# Patient Record
Sex: MALE
[De-identification: names, ages, dates, MRNs, and addresses within clinical notes are randomized per-mention and may not be internally consistent; named-entity substitution may affect disease eponyms.]

---

## 2017-03-23 ENCOUNTER — CHARTING TRANS (OUTPATIENT)
Dept: OTHER | Age: 22
End: 2017-03-23

## 2018-11-05 VITALS
HEART RATE: 84 BPM | TEMPERATURE: 98.2 F | HEIGHT: 77 IN | RESPIRATION RATE: 20 BRPM | WEIGHT: 160 LBS | BODY MASS INDEX: 18.89 KG/M2

## 2019-08-09 ENCOUNTER — APPOINTMENT (OUTPATIENT)
Dept: ULTRASOUND IMAGING | Age: 24
DRG: 270 | End: 2019-08-09
Attending: PHYSICIAN ASSISTANT
Payer: MEDICAID

## 2019-08-09 ENCOUNTER — APPOINTMENT (OUTPATIENT)
Dept: CT IMAGING | Facility: HOSPITAL | Age: 24
DRG: 270 | End: 2019-08-09
Attending: EMERGENCY MEDICINE
Payer: MEDICAID

## 2019-08-09 ENCOUNTER — HOSPITAL ENCOUNTER (INPATIENT)
Facility: HOSPITAL | Age: 24
LOS: 5 days | Discharge: HOME | DRG: 270 | End: 2019-08-14
Attending: EMERGENCY MEDICINE | Admitting: HOSPITALIST
Payer: MEDICAID

## 2019-08-09 ENCOUNTER — APPOINTMENT (OUTPATIENT)
Dept: INTERVENTIONAL RADIOLOGY/VASCULAR | Facility: HOSPITAL | Age: 24
DRG: 270 | End: 2019-08-09
Attending: SURGERY
Payer: MEDICAID

## 2019-08-09 ENCOUNTER — APPOINTMENT (OUTPATIENT)
Dept: CT IMAGING | Age: 24
DRG: 270 | End: 2019-08-09
Attending: EMERGENCY MEDICINE
Payer: MEDICAID

## 2019-08-09 DIAGNOSIS — I26.99 ACUTE PULMONARY EMBOLISM WITHOUT ACUTE COR PULMONALE, UNSPECIFIED PULMONARY EMBOLISM TYPE (HCC): ICD-10-CM

## 2019-08-09 DIAGNOSIS — G57.82 NEURITIS OF LEFT SURAL NERVE: Primary | ICD-10-CM

## 2019-08-09 DIAGNOSIS — I26.99 PULMONARY INFARCT (HCC): ICD-10-CM

## 2019-08-09 DIAGNOSIS — I82.492 ACUTE DEEP VEIN THROMBOSIS (DVT) OF OTHER SPECIFIED VEIN OF LEFT LOWER EXTREMITY (HCC): ICD-10-CM

## 2019-08-09 DIAGNOSIS — I70.90 ARTERIAL OCCLUSION: ICD-10-CM

## 2019-08-09 LAB
ALBUMIN SERPL-MCNC: 3.5 G/DL (ref 3.4–5)
ALP LIVER SERPL-CCNC: 88 U/L (ref 45–117)
ALT SERPL-CCNC: 34 U/L (ref 16–61)
ANION GAP SERPL CALC-SCNC: 8 MMOL/L (ref 0–18)
APTT PPP: 31.7 SECONDS (ref 25.4–36.1)
AST SERPL-CCNC: 30 U/L (ref 15–37)
BASOPHILS # BLD AUTO: 0.03 X10(3) UL (ref 0–0.2)
BASOPHILS NFR BLD AUTO: 0.4 %
BILIRUB DIRECT SERPL-MCNC: <0.1 MG/DL (ref 0–0.2)
BILIRUB SERPL-MCNC: 0.4 MG/DL (ref 0.1–2)
BUN BLD-MCNC: 6 MG/DL (ref 7–18)
BUN/CREAT SERPL: 7.3 (ref 10–20)
CALCIUM BLD-MCNC: 8.9 MG/DL (ref 8.5–10.1)
CHLORIDE SERPL-SCNC: 103 MMOL/L (ref 98–112)
CO2 SERPL-SCNC: 26 MMOL/L (ref 21–32)
CREAT BLD-MCNC: 0.82 MG/DL (ref 0.7–1.3)
DEPRECATED RDW RBC AUTO: 39.8 FL (ref 35.1–46.3)
EOSINOPHIL # BLD AUTO: 0.07 X10(3) UL (ref 0–0.7)
EOSINOPHIL NFR BLD AUTO: 1 %
ERYTHROCYTE [DISTWIDTH] IN BLOOD BY AUTOMATED COUNT: 11.8 % (ref 11–15)
GLUCOSE BLD-MCNC: 92 MG/DL (ref 70–99)
HCT VFR BLD AUTO: 42.9 % (ref 39–53)
HGB BLD-MCNC: 14.9 G/DL (ref 13–17.5)
IMM GRANULOCYTES # BLD AUTO: 0.02 X10(3) UL (ref 0–1)
IMM GRANULOCYTES NFR BLD: 0.3 %
INR BLD: 0.89 (ref 0.9–1.1)
LYMPHOCYTES # BLD AUTO: 2.05 X10(3) UL (ref 1–4)
LYMPHOCYTES NFR BLD AUTO: 29 %
M PROTEIN MFR SERPL ELPH: 7.4 G/DL (ref 6.4–8.2)
MCH RBC QN AUTO: 32.3 PG (ref 26–34)
MCHC RBC AUTO-ENTMCNC: 34.7 G/DL (ref 31–37)
MCV RBC AUTO: 93.1 FL (ref 80–100)
MONOCYTES # BLD AUTO: 0.68 X10(3) UL (ref 0.1–1)
MONOCYTES NFR BLD AUTO: 9.6 %
NEUTROPHILS # BLD AUTO: 4.21 X10 (3) UL (ref 1.5–7.7)
NEUTROPHILS # BLD AUTO: 4.21 X10(3) UL (ref 1.5–7.7)
NEUTROPHILS NFR BLD AUTO: 59.7 %
OSMOLALITY SERPL CALC.SUM OF ELEC: 281 MOSM/KG (ref 275–295)
PLATELET # BLD AUTO: 239 10(3)UL (ref 150–450)
POTASSIUM SERPL-SCNC: 4.6 MMOL/L (ref 3.5–5.1)
PSA SERPL DL<=0.01 NG/ML-MCNC: 12.1 SECONDS (ref 12.2–14.4)
RBC # BLD AUTO: 4.61 X10(6)UL (ref 4.3–5.7)
SODIUM SERPL-SCNC: 137 MMOL/L (ref 136–145)
WBC # BLD AUTO: 7.1 X10(3) UL (ref 4–11)

## 2019-08-09 PROCEDURE — 99223 1ST HOSP IP/OBS HIGH 75: CPT | Performed by: HOSPITALIST

## 2019-08-09 PROCEDURE — 04HN33Z INSERTION OF INFUSION DEVICE INTO LEFT POPLITEAL ARTERY, PERCUTANEOUS APPROACH: ICD-10-PCS | Performed by: SURGERY

## 2019-08-09 PROCEDURE — 3E05317 INTRODUCTION OF OTHER THROMBOLYTIC INTO PERIPHERAL ARTERY, PERCUTANEOUS APPROACH: ICD-10-PCS | Performed by: SURGERY

## 2019-08-09 PROCEDURE — 93971 EXTREMITY STUDY: CPT | Performed by: PHYSICIAN ASSISTANT

## 2019-08-09 PROCEDURE — 71275 CT ANGIOGRAPHY CHEST: CPT | Performed by: EMERGENCY MEDICINE

## 2019-08-09 PROCEDURE — 74175 CTA ABDOMEN W/CONTRAST: CPT | Performed by: EMERGENCY MEDICINE

## 2019-08-09 PROCEDURE — B41G1ZZ FLUOROSCOPY OF LEFT LOWER EXTREMITY ARTERIES USING LOW OSMOLAR CONTRAST: ICD-10-PCS | Performed by: SURGERY

## 2019-08-09 PROCEDURE — 73706 CT ANGIO LWR EXTR W/O&W/DYE: CPT | Performed by: EMERGENCY MEDICINE

## 2019-08-09 RX ORDER — MIDAZOLAM HYDROCHLORIDE 1 MG/ML
INJECTION INTRAMUSCULAR; INTRAVENOUS
Status: COMPLETED
Start: 2019-08-09 | End: 2019-08-09

## 2019-08-09 RX ORDER — LIDOCAINE HYDROCHLORIDE 10 MG/ML
INJECTION, SOLUTION EPIDURAL; INFILTRATION; INTRACAUDAL; PERINEURAL
Status: COMPLETED
Start: 2019-08-09 | End: 2019-08-09

## 2019-08-09 RX ORDER — HEPARIN SODIUM 5000 [USP'U]/ML
INJECTION, SOLUTION INTRAVENOUS; SUBCUTANEOUS
Status: COMPLETED
Start: 2019-08-09 | End: 2019-08-09

## 2019-08-09 RX ORDER — HEPARIN SODIUM 5000 [USP'U]/ML
80 INJECTION INTRAVENOUS; SUBCUTANEOUS ONCE
Status: COMPLETED | OUTPATIENT
Start: 2019-08-09 | End: 2019-08-09

## 2019-08-09 RX ORDER — LORAZEPAM 2 MG/ML
1 INJECTION INTRAMUSCULAR ONCE
Status: COMPLETED | OUTPATIENT
Start: 2019-08-09 | End: 2019-08-09

## 2019-08-09 RX ORDER — METHYLPREDNISOLONE 4 MG/1
TABLET ORAL
Qty: 1 PACKAGE | Refills: 0 | Status: SHIPPED | OUTPATIENT
Start: 2019-08-09 | End: 2019-08-09

## 2019-08-09 RX ORDER — HEPARIN SODIUM AND DEXTROSE 10000; 5 [USP'U]/100ML; G/100ML
18 INJECTION INTRAVENOUS ONCE
Status: COMPLETED | OUTPATIENT
Start: 2019-08-09 | End: 2019-08-10
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
## 2019-08-09 NOTE — ED PROVIDER NOTES
Patient Seen in: [facility] [place]    History   Patient presents with:  Lower Extremity Injury (musculoskeletal)    Stated Complaint: L leg pain since 7-4, no known injury    64-year-old  male without significant past med time. He appears well-developed and well-nourished. No distress.    Musculoskeletal:        Left hip: Normal.        Left knee: Normal.        Left ankle: Normal.        Lumbar back: Normal.        Left upper leg: Normal.        Left lower leg: Normal. 2010  ------------------------------------------------------------  Time: 08/09 1906  Value: PTT, Activated  Comment: (Reviewed)  ------------------------------------------------------------  Time: 08/09 1925  Value: PTT, Activated  Comment: (Reviewed) Dictated by: Scot Cranker, MD on 8/09/2019 at 18:18     Approved by: Scot Cranker, MD              [facility]   This patient is very comfortable and in NAD.       Admission disposition: 8/9/2019  7:13 PM                 Disposition and Plan     Clinical Im

## 2019-08-10 ENCOUNTER — APPOINTMENT (OUTPATIENT)
Dept: CV DIAGNOSTICS | Facility: HOSPITAL | Age: 24
DRG: 270 | End: 2019-08-10
Attending: HOSPITALIST
Payer: MEDICAID

## 2019-08-10 ENCOUNTER — APPOINTMENT (OUTPATIENT)
Dept: INTERVENTIONAL RADIOLOGY/VASCULAR | Facility: HOSPITAL | Age: 24
DRG: 270 | End: 2019-08-10
Attending: SURGERY
Payer: MEDICAID

## 2019-08-10 LAB
AMPHET UR QL SCN: NEGATIVE
ANION GAP SERPL CALC-SCNC: 6 MMOL/L (ref 0–18)
APTT PPP: 37.7 SECONDS (ref 25.4–36.1)
APTT PPP: 43 SECONDS (ref 25.4–36.1)
APTT PPP: 45.1 SECONDS (ref 25.4–36.1)
APTT PPP: 49 SECONDS (ref 25.4–36.1)
APTT PPP: 49.3 SECONDS (ref 25.4–36.1)
BARBITURATES UR QL SCN: NEGATIVE
BUN BLD-MCNC: 5 MG/DL (ref 7–18)
BUN/CREAT SERPL: 6.8 (ref 10–20)
CALCIUM BLD-MCNC: 8.1 MG/DL (ref 8.5–10.1)
CHLORIDE SERPL-SCNC: 109 MMOL/L (ref 98–112)
CO2 SERPL-SCNC: 24 MMOL/L (ref 21–32)
COCAINE UR QL: NEGATIVE
CREAT BLD-MCNC: 0.73 MG/DL (ref 0.7–1.3)
CREAT UR-SCNC: 149 MG/DL
DEPRECATED RDW RBC AUTO: 38.6 FL (ref 35.1–46.3)
DEPRECATED RDW RBC AUTO: 38.8 FL (ref 35.1–46.3)
DEPRECATED RDW RBC AUTO: 39.1 FL (ref 35.1–46.3)
DEPRECATED RDW RBC AUTO: 39.2 FL (ref 35.1–46.3)
DEPRECATED RDW RBC AUTO: 39.6 FL (ref 35.1–46.3)
ERYTHROCYTE [DISTWIDTH] IN BLOOD BY AUTOMATED COUNT: 11.4 % (ref 11–15)
ERYTHROCYTE [DISTWIDTH] IN BLOOD BY AUTOMATED COUNT: 11.5 % (ref 11–15)
ERYTHROCYTE [DISTWIDTH] IN BLOOD BY AUTOMATED COUNT: 11.5 % (ref 11–15)
ERYTHROCYTE [DISTWIDTH] IN BLOOD BY AUTOMATED COUNT: 11.6 % (ref 11–15)
ERYTHROCYTE [DISTWIDTH] IN BLOOD BY AUTOMATED COUNT: 11.6 % (ref 11–15)
ETHANOL UR-MCNC: NEGATIVE MG/DL
FIBRINOGEN: 176 MG/DL (ref 200–446)
FIBRINOGEN: 181 MG/DL (ref 200–446)
FIBRINOGEN: 200 MG/DL (ref 200–446)
FIBRINOGEN: 235 MG/DL (ref 200–446)
FIBRINOGEN: 377 MG/DL (ref 200–446)
GLUCOSE BLD-MCNC: 81 MG/DL (ref 70–99)
HAV IGM SER QL: 2 MG/DL (ref 1.6–2.6)
HCT VFR BLD AUTO: 34.2 % (ref 39–53)
HCT VFR BLD AUTO: 35.7 % (ref 39–53)
HCT VFR BLD AUTO: 36.4 % (ref 39–53)
HCT VFR BLD AUTO: 36.4 % (ref 39–53)
HCT VFR BLD AUTO: 37.4 % (ref 39–53)
HGB BLD-MCNC: 12.1 G/DL (ref 13–17.5)
HGB BLD-MCNC: 12.7 G/DL (ref 13–17.5)
HGB BLD-MCNC: 12.8 G/DL (ref 13–17.5)
HGB BLD-MCNC: 13 G/DL (ref 13–17.5)
HGB BLD-MCNC: 13.2 G/DL (ref 13–17.5)
INR BLD: 1.02 (ref 0.9–1.1)
INR BLD: 1.15 (ref 0.9–1.1)
INR BLD: 1.17 (ref 0.9–1.1)
INR BLD: 1.18 (ref 0.9–1.1)
INR BLD: 1.18 (ref 0.9–1.1)
MCH RBC QN AUTO: 32.3 PG (ref 26–34)
MCH RBC QN AUTO: 32.4 PG (ref 26–34)
MCH RBC QN AUTO: 32.5 PG (ref 26–34)
MCH RBC QN AUTO: 32.7 PG (ref 26–34)
MCH RBC QN AUTO: 33 PG (ref 26–34)
MCHC RBC AUTO-ENTMCNC: 35.2 G/DL (ref 31–37)
MCHC RBC AUTO-ENTMCNC: 35.3 G/DL (ref 31–37)
MCHC RBC AUTO-ENTMCNC: 35.4 G/DL (ref 31–37)
MCHC RBC AUTO-ENTMCNC: 35.6 G/DL (ref 31–37)
MCHC RBC AUTO-ENTMCNC: 35.7 G/DL (ref 31–37)
MCV RBC AUTO: 91.1 FL (ref 80–100)
MCV RBC AUTO: 91.9 FL (ref 80–100)
MCV RBC AUTO: 92.1 FL (ref 80–100)
MCV RBC AUTO: 92.4 FL (ref 80–100)
MCV RBC AUTO: 92.4 FL (ref 80–100)
OSMOLALITY SERPL CALC.SUM OF ELEC: 284 MOSM/KG (ref 275–295)
PCP UR QL SCN: NEGATIVE
PHOSPHATE SERPL-MCNC: 3.4 MG/DL (ref 2.5–4.9)
PLATELET # BLD AUTO: 157 10(3)UL (ref 150–450)
PLATELET # BLD AUTO: 160 10(3)UL (ref 150–450)
PLATELET # BLD AUTO: 167 10(3)UL (ref 150–450)
PLATELET # BLD AUTO: 175 10(3)UL (ref 150–450)
PLATELET # BLD AUTO: 185 10(3)UL (ref 150–450)
POTASSIUM SERPL-SCNC: 3.7 MMOL/L (ref 3.5–5.1)
PSA SERPL DL<=0.01 NG/ML-MCNC: 13.8 SECONDS (ref 12.5–14.7)
PSA SERPL DL<=0.01 NG/ML-MCNC: 15.2 SECONDS (ref 12.5–14.7)
PSA SERPL DL<=0.01 NG/ML-MCNC: 15.5 SECONDS (ref 12.5–14.7)
PSA SERPL DL<=0.01 NG/ML-MCNC: 15.6 SECONDS (ref 12.5–14.7)
PSA SERPL DL<=0.01 NG/ML-MCNC: 15.6 SECONDS (ref 12.5–14.7)
RBC # BLD AUTO: 3.7 X10(6)UL (ref 4.3–5.7)
RBC # BLD AUTO: 3.92 X10(6)UL (ref 4.3–5.7)
RBC # BLD AUTO: 3.94 X10(6)UL (ref 4.3–5.7)
RBC # BLD AUTO: 3.96 X10(6)UL (ref 4.3–5.7)
RBC # BLD AUTO: 4.06 X10(6)UL (ref 4.3–5.7)
SODIUM SERPL-SCNC: 139 MMOL/L (ref 136–145)
WBC # BLD AUTO: 5.9 X10(3) UL (ref 4–11)
WBC # BLD AUTO: 7.4 X10(3) UL (ref 4–11)
WBC # BLD AUTO: 7.4 X10(3) UL (ref 4–11)
WBC # BLD AUTO: 7.5 X10(3) UL (ref 4–11)
WBC # BLD AUTO: 8.1 X10(3) UL (ref 4–11)

## 2019-08-10 PROCEDURE — 99232 SBSQ HOSP IP/OBS MODERATE 35: CPT | Performed by: STUDENT IN AN ORGANIZED HEALTH CARE EDUCATION/TRAINING PROGRAM

## 2019-08-10 PROCEDURE — B41G1ZZ FLUOROSCOPY OF LEFT LOWER EXTREMITY ARTERIES USING LOW OSMOLAR CONTRAST: ICD-10-PCS | Performed by: SURGERY

## 2019-08-10 PROCEDURE — 3E05317 INTRODUCTION OF OTHER THROMBOLYTIC INTO PERIPHERAL ARTERY, PERCUTANEOUS APPROACH: ICD-10-PCS | Performed by: SURGERY

## 2019-08-10 PROCEDURE — 99255 IP/OBS CONSLTJ NEW/EST HI 80: CPT | Performed by: INTERNAL MEDICINE

## 2019-08-10 PROCEDURE — 93306 TTE W/DOPPLER COMPLETE: CPT | Performed by: HOSPITALIST

## 2019-08-10 PROCEDURE — 04HS33Z INSERTION OF INFUSION DEVICE INTO LEFT POSTERIOR TIBIAL ARTERY, PERCUTANEOUS APPROACH: ICD-10-PCS | Performed by: SURGERY

## 2019-08-10 RX ORDER — MORPHINE SULFATE 4 MG/ML
2 INJECTION, SOLUTION INTRAMUSCULAR; INTRAVENOUS EVERY 2 HOUR PRN
Status: DISCONTINUED | OUTPATIENT
Start: 2019-08-10 | End: 2019-08-14

## 2019-08-10 RX ORDER — HEPARIN SODIUM 5000 [USP'U]/ML
INJECTION, SOLUTION INTRAVENOUS; SUBCUTANEOUS
Status: COMPLETED
Start: 2019-08-10 | End: 2019-08-10

## 2019-08-10 RX ORDER — ACETAMINOPHEN 325 MG/1
650 TABLET ORAL EVERY 4 HOURS PRN
Status: DISCONTINUED | OUTPATIENT
Start: 2019-08-10 | End: 2019-08-14

## 2019-08-10 RX ORDER — MIDAZOLAM HYDROCHLORIDE 1 MG/ML
INJECTION INTRAMUSCULAR; INTRAVENOUS
Status: COMPLETED
Start: 2019-08-10 | End: 2019-08-10

## 2019-08-10 RX ORDER — NICOTINE 21 MG/24HR
1 PATCH, TRANSDERMAL 24 HOURS TRANSDERMAL DAILY
Status: DISCONTINUED | OUTPATIENT
Start: 2019-08-10 | End: 2019-08-14

## 2019-08-10 RX ORDER — MORPHINE SULFATE 4 MG/ML
4 INJECTION, SOLUTION INTRAMUSCULAR; INTRAVENOUS EVERY 2 HOUR PRN
Status: DISCONTINUED | OUTPATIENT
Start: 2019-08-10 | End: 2019-08-14

## 2019-08-10 RX ORDER — LORAZEPAM 2 MG/ML
2 INJECTION INTRAMUSCULAR
Status: DISCONTINUED | OUTPATIENT
Start: 2019-08-10 | End: 2019-08-13

## 2019-08-10 RX ORDER — METOCLOPRAMIDE HYDROCHLORIDE 5 MG/ML
10 INJECTION INTRAMUSCULAR; INTRAVENOUS EVERY 8 HOURS PRN
Status: DISCONTINUED | OUTPATIENT
Start: 2019-08-10 | End: 2019-08-14

## 2019-08-10 RX ORDER — MORPHINE SULFATE 4 MG/ML
INJECTION, SOLUTION INTRAMUSCULAR; INTRAVENOUS
Status: DISPENSED
Start: 2019-08-10 | End: 2019-08-10

## 2019-08-10 RX ORDER — HYDROCODONE BITARTRATE AND ACETAMINOPHEN 5; 325 MG/1; MG/1
1 TABLET ORAL EVERY 4 HOURS PRN
Status: DISCONTINUED | OUTPATIENT
Start: 2019-08-10 | End: 2019-08-14

## 2019-08-10 RX ORDER — POTASSIUM CHLORIDE 14.9 MG/ML
20 INJECTION INTRAVENOUS ONCE
Status: COMPLETED | OUTPATIENT
Start: 2019-08-10 | End: 2019-08-10

## 2019-08-10 RX ORDER — SODIUM CHLORIDE 9 MG/ML
INJECTION, SOLUTION INTRAVENOUS CONTINUOUS
Status: DISCONTINUED | OUTPATIENT
Start: 2019-08-10 | End: 2019-08-12

## 2019-08-10 RX ORDER — LORAZEPAM 2 MG/ML
1 INJECTION INTRAMUSCULAR EVERY 30 MIN PRN
Status: DISCONTINUED | OUTPATIENT
Start: 2019-08-10 | End: 2019-08-13

## 2019-08-10 RX ORDER — LORAZEPAM 2 MG/ML
3 INJECTION INTRAMUSCULAR
Status: DISCONTINUED | OUTPATIENT
Start: 2019-08-10 | End: 2019-08-13

## 2019-08-10 RX ORDER — HEPARIN SODIUM AND DEXTROSE 10000; 5 [USP'U]/100ML; G/100ML
INJECTION INTRAVENOUS CONTINUOUS
Status: DISCONTINUED | OUTPATIENT
Start: 2019-08-10 | End: 2019-08-14

## 2019-08-10 RX ORDER — ONDANSETRON 2 MG/ML
4 INJECTION INTRAMUSCULAR; INTRAVENOUS EVERY 6 HOURS PRN
Status: DISCONTINUED | OUTPATIENT
Start: 2019-08-10 | End: 2019-08-14

## 2019-08-10 RX ORDER — LIDOCAINE HYDROCHLORIDE 10 MG/ML
INJECTION, SOLUTION EPIDURAL; INFILTRATION; INTRACAUDAL; PERINEURAL
Status: COMPLETED
Start: 2019-08-10 | End: 2019-08-10

## 2019-08-10 RX ORDER — LORAZEPAM 2 MG/ML
4 INJECTION INTRAMUSCULAR EVERY 10 MIN PRN
Status: DISCONTINUED | OUTPATIENT
Start: 2019-08-10 | End: 2019-08-13

## 2019-08-10 RX ORDER — HYDROCODONE BITARTRATE AND ACETAMINOPHEN 5; 325 MG/1; MG/1
2 TABLET ORAL EVERY 4 HOURS PRN
Status: DISCONTINUED | OUTPATIENT
Start: 2019-08-10 | End: 2019-08-14

## 2019-08-10 RX ORDER — ONDANSETRON 2 MG/ML
4 INJECTION INTRAMUSCULAR; INTRAVENOUS EVERY 6 HOURS PRN
Status: DISCONTINUED | OUTPATIENT
Start: 2019-08-10 | End: 2019-08-10

## 2019-08-10 RX ORDER — MORPHINE SULFATE 4 MG/ML
1 INJECTION, SOLUTION INTRAMUSCULAR; INTRAVENOUS EVERY 2 HOUR PRN
Status: DISCONTINUED | OUTPATIENT
Start: 2019-08-10 | End: 2019-08-14

## 2019-08-10 RX ORDER — SODIUM CHLORIDE 9 MG/ML
INJECTION, SOLUTION INTRAVENOUS CONTINUOUS
Status: DISCONTINUED | OUTPATIENT
Start: 2019-08-10 | End: 2019-08-11

## 2019-08-10 NOTE — PROGRESS NOTES
[address] Patient Status:  Inpatient    1995 MRN [id number]   [facility] 6NE-A Attending Néstor Stroud MD   Hosp Day # 0 PCP No primary care provider on file.      Pulm / Critical Care Progress Note     S: u 0.89* 1.02 1. 15*         Recent Labs   Lab 08/09/19  1821 08/10/19  0438    139   K 4.6 3.7    109   CO2 26.0 24.0   BUN 6* 5*     Creatinine (mg/dL)   Date Value   08/10/2019 0.73   08/09/2019 0.82   ]    Recent Labs   Lab 08/09/19  1821   ALT

## 2019-08-10 NOTE — PROGRESS NOTES
Vascular Surgery Progress Note    Pt seen and examined this am. Pt with minimal pain. Sensation and motor function continue to improved. Extremity feels warm. Vitals and labs wnl. His doppler signal is much improved in the AT and PT.  Still palpable in the

## 2019-08-10 NOTE — CONSULTS
Vascular Surgery  New Patient Consultation    Name: Ingris Augustin  MRN: [id number]  : 1995    Referring Provider: No ref.  provider found    CC: LLE numbness, tingling and weakness     HPI: 21 yo M with hx of tobacco and alcohol abuse who pres GI: No nausea, vomiting or diarrhea. No blood in stools. Neurologic: No seizures, tremors, weakness or numbness.      Physical Examination   08/09/19  1704 08/09/19  1908 08/09/19  2100 08/09/19  2145   BP: 113/71 112/80 124/66 120/74   Pulse: 97 90 57 72 21 yo M who presents with LLE DVT with PE and left popliteal thrombus. After discussing history and lengthy discussion, it is likely this is a paradoxical embolus due to PFO to explain a primary DVT with PE and arterial thrombus.  Pt initially with numbness

## 2019-08-10 NOTE — BRIEF OP NOTE
Pre-Operative Diagnosis: [place] 2A ischemia     Post-Operative Diagnosis: [place] 2 A ischemia     Procedure Performed:   1. US guided access Right Common Femoral Artery  2. Selective Catheterization of 3rd order branch  3.  LLE angio with radiologic

## 2019-08-10 NOTE — ED PROVIDER NOTES
Met the patient when he arrived from [place]. After the heparin had been started patient does have a slight pulse noted. Vascular surgeon was at bedside and reviewed films.     Us Venous Doppler Leg Left - Diag Img (cpt=93971)    Result Date: 8/9/2019 hours.  Read back was performed. 2. Normal appearance of the abdominal and thoracic aorta. The visualized aspects of the iliac vessels are also unremarkable. 3. Patchy ground-glass type opacities within the right lung as described above.   Small regions of

## 2019-08-10 NOTE — CONSULTS
[facility]  Report of Consultation    Colt Julian Patient Status:  Inpatient    1995 MRN [id number]   [facility] 6NE-A Attending Benji Pyle MD   Hosp Day # 0 PCP No primary care provider on file.      Reason for Consulta infusion, , Intravenous, Continuous  •  acetaminophen (TYLENOL) tab 650 mg, 650 mg, Oral, Q4H PRN **OR** HYDROcodone-acetaminophen (NORCO) 5-325 MG per tab 1 tablet, 1 tablet, Oral, Q4H PRN **OR** HYDROcodone-acetaminophen (NORCO) 5-325 MG per tab 2 tablet throughout in the cervical, supraclavicular, or axillary regions.       Laboratory Data reviewed at this visit:    Recent Labs   Lab 08/09/19  1821 08/10/19  0438 08/10/19  0932 08/10/19  1305   RBC 4.61 4.06* 3.96* 3.94*   HGB 14.9 13.2 12.8* 13.0   HCT 42 images again reveal a complete occlusion of the distal left SFA and popliteal artery with reconstitution of attenuated flow at the level of the left trifurcation.     CTA chest/abdomen on 8/9/2019:  FINDINGS:       CHEST:  LUNGS:  Mild, nonspecific patchy a lobe.  Of note evaluation for pulmonary embolus is somewhat suboptimal due to the contrast opacification being optimized for the aorta. New line the findings of this critical value study were discussed with Dr. Dhruv Dillon on 8/9/2019 at 2023 hours.   Read ba with his family. He has poor compliance with medical care. This will be very difficult management. He likely will need long-term, indefinite anticoagulation.       Alize Sheppard MD  8/10/2019  2:01 PM

## 2019-08-10 NOTE — CONSULTS
Pulmonary / Critical Care H&P/Consult       NAME: Kayley Morales - ROOM: [facility] IVS/EH IVS Leonela Cough - MRN: [id number] - Age: 22year old - :  1995    Date of Admission: 2019  4:54 PM  Admission Diagnosis: Arterial occlusion [I70.90]  Neuritis of lef Active member of club or organization: Not on file        Attends meetings of clubs or organizations: Not on file        Relationship status: Not on file      Intimate partner violence:        Fear of current or ex partner: Not on file        Emotionally tenderness or deformity   Heart:    Regular rate and rhythm, S1 and S2 normal, no murmur, rub   or gallop   Abdomen:     Soft, non-tender, bowel sounds active all four quadrants,     no masses, no organomegaly   Extremities:   L foot cool with diminished p

## 2019-08-10 NOTE — PLAN OF CARE
0730 Received pt on alteplase and heparin per R groin sheath, cath extending to mid LLE. L foot cooler than R, anterior tibial and posterior tibial pulses audible by doppler, dorsalis pedis absent.  Mild numbness to L toes, movement and sensation intact, m

## 2019-08-10 NOTE — H&P
[facility]  History and Physical     Vernal Brooms Patient Status:  Emergency    1995 MRN [id number]   Location [facility][1] Attending No att. providers found   [address] Day # 0 PCP No primary care provider on surinder 4\" (1.93 m)   Wt 155 lb (70.3 kg)   SpO2 98%   BMI 18.87 kg/m²   General: No acute distress. Alert and oriented x 3. HEENT: Normocephalic atraumatic. Moist mucous membranes. EOM-I. PERRLA. Anicteric. Neck: No lymphadenopathy. No JVD. No carotid bruits. gtt  · CODE status: Full  · Schneider: no    Plan of care discussed with patient and RN.     Dwayne Guerra MD  8/9/2019

## 2019-08-10 NOTE — PLAN OF CARE
Patient VSS. Surgical site (right groin) is intact, dressing intact, no hematoma, Lytics infusing (TPA and Heparin gtt). Lying flat, anxious with periods of frustration. CIWA protocol with PRN ativan given as needed.   NPO with plan to revisit site for po

## 2019-08-10 NOTE — BRIEF OP NOTE
Pre-Operative Diagnosis: [place] 2A ischemia     Post-Operative Diagnosis: [place] 2 A ischemia     Procedure Performed:   1. Selective Catheterization of 3rd order branch  2. LLE angio with radiologic supervision and interpretation  3.  Replacement o

## 2019-08-10 NOTE — PROGRESS NOTES
[facility]  Progress Note     Alexeiadriano Tamez Patient Status:  Inpatient    1995 MRN [id number]   [facility] 6NE-A Attending Néstor Stroud MD   Hosp Day # 0 PCP No primary care provider on file.      Chief Complaint: Jacquelyn Hughes patch Transdermal Daily       ASSESSMENT / PLAN:     1. Complete occlusion of left leg likely due to paradoxical embolus due to PFO  1. Echo (p)  2. tpa, Hep gtt   3. Vascular Sx on Cs  4. Hem Eval likely has underlying autoimmune dx/ coaguloapthy   2.  LLE

## 2019-08-11 ENCOUNTER — APPOINTMENT (OUTPATIENT)
Dept: INTERVENTIONAL RADIOLOGY/VASCULAR | Facility: HOSPITAL | Age: 24
DRG: 270 | End: 2019-08-11
Attending: SURGERY
Payer: MEDICAID

## 2019-08-11 LAB
ANION GAP SERPL CALC-SCNC: 7 MMOL/L (ref 0–18)
ANTIBODY SCREEN: NEGATIVE
APTT PPP: 23.3 SECONDS (ref 25.4–36.1)
APTT PPP: 37.8 SECONDS (ref 25.4–36.1)
APTT PPP: 44.7 SECONDS (ref 25.4–36.1)
APTT PPP: 46.3 SECONDS (ref 25.4–36.1)
APTT PPP: 46.4 SECONDS (ref 25.4–36.1)
BASOPHILS # BLD AUTO: 0.05 X10(3) UL (ref 0–0.2)
BASOPHILS NFR BLD AUTO: 0.4 %
BUN BLD-MCNC: 5 MG/DL (ref 7–18)
BUN/CREAT SERPL: 8.8 (ref 10–20)
CALCIUM BLD-MCNC: 8.1 MG/DL (ref 8.5–10.1)
CHLORIDE SERPL-SCNC: 106 MMOL/L (ref 98–112)
CO2 SERPL-SCNC: 23 MMOL/L (ref 21–32)
CREAT BLD-MCNC: 0.57 MG/DL (ref 0.7–1.3)
DEPRECATED RDW RBC AUTO: 39.3 FL (ref 35.1–46.3)
DEPRECATED RDW RBC AUTO: 39.3 FL (ref 35.1–46.3)
DEPRECATED RDW RBC AUTO: 39.6 FL (ref 35.1–46.3)
DEPRECATED RDW RBC AUTO: 40.1 FL (ref 35.1–46.3)
EOSINOPHIL # BLD AUTO: 0.07 X10(3) UL (ref 0–0.7)
EOSINOPHIL NFR BLD AUTO: 0.6 %
ERYTHROCYTE [DISTWIDTH] IN BLOOD BY AUTOMATED COUNT: 11.5 % (ref 11–15)
ERYTHROCYTE [DISTWIDTH] IN BLOOD BY AUTOMATED COUNT: 11.6 % (ref 11–15)
ERYTHROCYTE [DISTWIDTH] IN BLOOD BY AUTOMATED COUNT: 11.7 % (ref 11–15)
ERYTHROCYTE [DISTWIDTH] IN BLOOD BY AUTOMATED COUNT: 11.7 % (ref 11–15)
FIBRINOGEN: 156 MG/DL (ref 200–446)
FIBRINOGEN: 158 MG/DL (ref 200–446)
FIBRINOGEN: 161 MG/DL (ref 200–446)
GLUCOSE BLD-MCNC: 94 MG/DL (ref 70–99)
HAV IGM SER QL: 1.6 MG/DL (ref 1.6–2.6)
HCT VFR BLD AUTO: 21.8 % (ref 39–53)
HCT VFR BLD AUTO: 26.5 % (ref 39–53)
HCT VFR BLD AUTO: 29.1 % (ref 39–53)
HCT VFR BLD AUTO: 31.4 % (ref 39–53)
HCT VFR BLD AUTO: 32.2 % (ref 39–53)
HGB BLD-MCNC: 10 G/DL (ref 13–17.5)
HGB BLD-MCNC: 10.9 G/DL (ref 13–17.5)
HGB BLD-MCNC: 11.3 G/DL (ref 13–17.5)
HGB BLD-MCNC: 7.5 G/DL (ref 13–17.5)
HGB BLD-MCNC: 8.1 G/DL (ref 13–17.5)
HGB BLD-MCNC: 9.3 G/DL (ref 13–17.5)
IMM GRANULOCYTES # BLD AUTO: 0.07 X10(3) UL (ref 0–1)
IMM GRANULOCYTES NFR BLD: 0.6 %
INR BLD: 1.21 (ref 0.9–1.1)
INR BLD: 1.24 (ref 0.9–1.1)
INR BLD: 1.25 (ref 0.9–1.1)
INR BLD: 1.25 (ref 0.9–1.1)
LYMPHOCYTES # BLD AUTO: 1.53 X10(3) UL (ref 1–4)
LYMPHOCYTES NFR BLD AUTO: 13.6 %
MCH RBC QN AUTO: 32.3 PG (ref 26–34)
MCH RBC QN AUTO: 32.4 PG (ref 26–34)
MCH RBC QN AUTO: 32.5 PG (ref 26–34)
MCH RBC QN AUTO: 32.5 PG (ref 26–34)
MCHC RBC AUTO-ENTMCNC: 34.4 G/DL (ref 31–37)
MCHC RBC AUTO-ENTMCNC: 34.4 G/DL (ref 31–37)
MCHC RBC AUTO-ENTMCNC: 34.7 G/DL (ref 31–37)
MCHC RBC AUTO-ENTMCNC: 35.1 G/DL (ref 31–37)
MCV RBC AUTO: 92.5 FL (ref 80–100)
MCV RBC AUTO: 93.5 FL (ref 80–100)
MCV RBC AUTO: 93.9 FL (ref 80–100)
MCV RBC AUTO: 94.4 FL (ref 80–100)
MONOCYTES # BLD AUTO: 0.77 X10(3) UL (ref 0.1–1)
MONOCYTES NFR BLD AUTO: 6.9 %
NEUTROPHILS # BLD AUTO: 8.74 X10 (3) UL (ref 1.5–7.7)
NEUTROPHILS # BLD AUTO: 8.74 X10(3) UL (ref 1.5–7.7)
NEUTROPHILS NFR BLD AUTO: 77.9 %
OSMOLALITY SERPL CALC.SUM OF ELEC: 279 MOSM/KG (ref 275–295)
PLATELET # BLD AUTO: 146 10(3)UL (ref 150–450)
PLATELET # BLD AUTO: 154 10(3)UL (ref 150–450)
PLATELET # BLD AUTO: 164 10(3)UL (ref 150–450)
PLATELET # BLD AUTO: 179 10(3)UL (ref 150–450)
POTASSIUM SERPL-SCNC: 3.8 MMOL/L (ref 3.5–5.1)
PSA SERPL DL<=0.01 NG/ML-MCNC: 15.9 SECONDS (ref 12.5–14.7)
PSA SERPL DL<=0.01 NG/ML-MCNC: 16.2 SECONDS (ref 12.5–14.7)
PSA SERPL DL<=0.01 NG/ML-MCNC: 16.3 SECONDS (ref 12.5–14.7)
PSA SERPL DL<=0.01 NG/ML-MCNC: 16.3 SECONDS (ref 12.5–14.7)
RBC # BLD AUTO: 2.31 X10(6)UL (ref 4.3–5.7)
RBC # BLD AUTO: 3.1 X10(6)UL (ref 4.3–5.7)
RBC # BLD AUTO: 3.36 X10(6)UL (ref 4.3–5.7)
RBC # BLD AUTO: 3.48 X10(6)UL (ref 4.3–5.7)
RH BLOOD TYPE: NEGATIVE
SODIUM SERPL-SCNC: 136 MMOL/L (ref 136–145)
WBC # BLD AUTO: 11.2 X10(3) UL (ref 4–11)
WBC # BLD AUTO: 7.2 X10(3) UL (ref 4–11)
WBC # BLD AUTO: 8.2 X10(3) UL (ref 4–11)
WBC # BLD AUTO: 8.3 X10(3) UL (ref 4–11)

## 2019-08-11 PROCEDURE — 30233N1 TRANSFUSION OF NONAUTOLOGOUS RED BLOOD CELLS INTO PERIPHERAL VEIN, PERCUTANEOUS APPROACH: ICD-10-PCS | Performed by: HOSPITALIST

## 2019-08-11 PROCEDURE — 047S3ZZ DILATION OF LEFT POSTERIOR TIBIAL ARTERY, PERCUTANEOUS APPROACH: ICD-10-PCS | Performed by: SURGERY

## 2019-08-11 PROCEDURE — 047N3ZZ DILATION OF LEFT POPLITEAL ARTERY, PERCUTANEOUS APPROACH: ICD-10-PCS | Performed by: SURGERY

## 2019-08-11 PROCEDURE — 04CN3ZZ EXTIRPATION OF MATTER FROM LEFT POPLITEAL ARTERY, PERCUTANEOUS APPROACH: ICD-10-PCS | Performed by: SURGERY

## 2019-08-11 PROCEDURE — 047Q3ZZ DILATION OF LEFT ANTERIOR TIBIAL ARTERY, PERCUTANEOUS APPROACH: ICD-10-PCS | Performed by: SURGERY

## 2019-08-11 PROCEDURE — 047U3ZZ DILATION OF LEFT PERONEAL ARTERY, PERCUTANEOUS APPROACH: ICD-10-PCS | Performed by: SURGERY

## 2019-08-11 PROCEDURE — 99233 SBSQ HOSP IP/OBS HIGH 50: CPT | Performed by: STUDENT IN AN ORGANIZED HEALTH CARE EDUCATION/TRAINING PROGRAM

## 2019-08-11 PROCEDURE — 04CS3ZZ EXTIRPATION OF MATTER FROM LEFT POSTERIOR TIBIAL ARTERY, PERCUTANEOUS APPROACH: ICD-10-PCS | Performed by: SURGERY

## 2019-08-11 PROCEDURE — B41G1ZZ FLUOROSCOPY OF LEFT LOWER EXTREMITY ARTERIES USING LOW OSMOLAR CONTRAST: ICD-10-PCS | Performed by: SURGERY

## 2019-08-11 PROCEDURE — 047W3ZZ DILATION OF LEFT FOOT ARTERY, PERCUTANEOUS APPROACH: ICD-10-PCS | Performed by: SURGERY

## 2019-08-11 RX ORDER — HEPARIN SODIUM 5000 [USP'U]/ML
INJECTION, SOLUTION INTRAVENOUS; SUBCUTANEOUS
Status: COMPLETED
Start: 2019-08-11 | End: 2019-08-11

## 2019-08-11 RX ORDER — MAGNESIUM SULFATE HEPTAHYDRATE 40 MG/ML
2 INJECTION, SOLUTION INTRAVENOUS ONCE
Status: COMPLETED | OUTPATIENT
Start: 2019-08-11 | End: 2019-08-11

## 2019-08-11 RX ORDER — LORAZEPAM 0.5 MG/1
0.5 TABLET ORAL EVERY 6 HOURS PRN
Status: DISCONTINUED | OUTPATIENT
Start: 2019-08-11 | End: 2019-08-14

## 2019-08-11 RX ORDER — LIDOCAINE HYDROCHLORIDE 10 MG/ML
INJECTION, SOLUTION EPIDURAL; INFILTRATION; INTRACAUDAL; PERINEURAL
Status: COMPLETED
Start: 2019-08-11 | End: 2019-08-11

## 2019-08-11 RX ORDER — SODIUM CHLORIDE 9 MG/ML
INJECTION, SOLUTION INTRAVENOUS ONCE
Status: COMPLETED | OUTPATIENT
Start: 2019-08-11 | End: 2019-08-11

## 2019-08-11 RX ORDER — POTASSIUM CHLORIDE 14.9 MG/ML
20 INJECTION INTRAVENOUS ONCE
Status: COMPLETED | OUTPATIENT
Start: 2019-08-11 | End: 2019-08-11

## 2019-08-11 RX ORDER — ONDANSETRON 2 MG/ML
INJECTION INTRAMUSCULAR; INTRAVENOUS
Status: COMPLETED
Start: 2019-08-11 | End: 2019-08-11

## 2019-08-11 RX ORDER — HEPARIN SODIUM 5000 [USP'U]/ML
30 INJECTION, SOLUTION INTRAVENOUS; SUBCUTANEOUS ONCE
Status: COMPLETED | OUTPATIENT
Start: 2019-08-12 | End: 2019-08-12

## 2019-08-11 RX ORDER — MIDAZOLAM HYDROCHLORIDE 1 MG/ML
INJECTION INTRAMUSCULAR; INTRAVENOUS
Status: COMPLETED
Start: 2019-08-11 | End: 2019-08-11

## 2019-08-11 RX ORDER — SODIUM CHLORIDE 9 MG/ML
INJECTION, SOLUTION INTRAVENOUS ONCE
Status: DISCONTINUED | OUTPATIENT
Start: 2019-08-11 | End: 2019-08-14

## 2019-08-11 NOTE — PLAN OF CARE
9850 to cath lab.  Dr. Haq Course updated on R and LLE vasc status- RLE swollen, soft, pedal pulse palpable, LLE warm to toes, pt states numbness to 3,4,5 toes, unable to doppler pedal or post tib. 1250 Returned from cath lab, SBP 70s, HR 150s, Hgb 7.5 post p

## 2019-08-11 NOTE — PROGRESS NOTES
[address] Patient Status:  Inpatient    1995 MRN [id number]   [facility] 6NE-A Attending Manny Penn MD   Hosp Day # 1 PCP No primary care provider on file.      Pulm / Critical Care Progress Note     S: u soft, non-tender, non-distended, positive BS.    Extremity: bruising R groin         Recent Labs   Lab 08/11/19  0416 08/11/19  0800 08/11/19  1210   WBC 8.3 8.2 11.2*   HGB 10.9* 10.0* 7.5*   HCT 31.4* 29.1* 21.8*   .0 164.0 179.0     Recent Labs

## 2019-08-11 NOTE — PROGRESS NOTES
Received patient at 1930  A/O x 4  NSR/ST on tele  Alteplase and heparin infusing per right groin sheath  Left foot dopplered post-tib/anterior tib pulses, pedal pulse absent  Numbness to left foot and complaining of increasing pain in left heel that is in

## 2019-08-11 NOTE — BH PROGRESS NOTE
Called RN to inform that liaison will likely see patient tomorrow since he will not be discharged today.

## 2019-08-11 NOTE — PROGRESS NOTES
[facility]  Progress Note     Bridgett Quiroga Patient Status:  Inpatient    1995 MRN [id number]   [facility] 6NE-A Attending Lora Holbrook MD   Hosp Day # 1 PCP No primary care provider on file.      Chief Complaint: Hemalatha Green 08/11/19  0051 08/11/19  0416 08/11/19  0800   PTP 16.3* 16.2* 15.9*   INR 1.25* 1.24* 1.21*       No results for input(s): TROP, CK in the last 168 hours. Imaging: Imaging data reviewed in Epic.     Medications:   • potassium chloride  20 mEq Intra

## 2019-08-12 ENCOUNTER — APPOINTMENT (OUTPATIENT)
Dept: CT IMAGING | Facility: HOSPITAL | Age: 24
DRG: 270 | End: 2019-08-12
Attending: INTERNAL MEDICINE
Payer: MEDICAID

## 2019-08-12 LAB
7-AMINOCLONAZEPAM, URINE: <5 NG/ML
ALPHA-HYDROXYALPRAZOLAM, URINE: <5 NG/ML
ALPHA-HYDROXYMIDAZOLAM, URINE: >4000 NG/ML
ALPRAZOLAM, URINE: <5 NG/ML
ANION GAP SERPL CALC-SCNC: 6 MMOL/L (ref 0–18)
APTT PPP: 48.4 SECONDS (ref 25.4–36.1)
APTT PPP: 53.4 SECONDS (ref 25.4–36.1)
APTT PPP: 56.7 SECONDS (ref 25.4–36.1)
ATRIAL RATE: 56 BPM
BASOPHILS # BLD AUTO: 0.03 X10(3) UL (ref 0–0.2)
BASOPHILS NFR BLD AUTO: 0.4 %
BLOOD TYPE BARCODE: 600
BUN BLD-MCNC: 6 MG/DL (ref 7–18)
BUN/CREAT SERPL: 9.7 (ref 10–20)
CALCIUM BLD-MCNC: 7.8 MG/DL (ref 8.5–10.1)
CHLORDIAZEPOXIDE, URINE: <20 NG/ML
CHLORIDE SERPL-SCNC: 105 MMOL/L (ref 98–112)
CK SERPL-CCNC: 315 U/L (ref 39–308)
CLONAZEPAM, URINE: <5 NG/ML
CO2 SERPL-SCNC: 27 MMOL/L (ref 21–32)
CREAT BLD-MCNC: 0.62 MG/DL (ref 0.7–1.3)
DEPRECATED RDW RBC AUTO: 44.8 FL (ref 35.1–46.3)
DEPRECATED RDW RBC AUTO: 46.3 FL (ref 35.1–46.3)
DIAZEPAM, URINE: <20 NG/ML
EOSINOPHIL # BLD AUTO: 0.24 X10(3) UL (ref 0–0.7)
EOSINOPHIL NFR BLD AUTO: 3.4 %
ERYTHROCYTE [DISTWIDTH] IN BLOOD BY AUTOMATED COUNT: 13.7 % (ref 11–15)
ERYTHROCYTE [DISTWIDTH] IN BLOOD BY AUTOMATED COUNT: 14 % (ref 11–15)
GLUCOSE BLD-MCNC: 87 MG/DL (ref 70–99)
HAV IGM SER QL: 2.1 MG/DL (ref 1.6–2.6)
HCT VFR BLD AUTO: 20.9 % (ref 39–53)
HCT VFR BLD AUTO: 23.4 % (ref 39–53)
HGB BLD-MCNC: 7.4 G/DL (ref 13–17.5)
HGB BLD-MCNC: 8 G/DL (ref 13–17.5)
HGB BLD-MCNC: 8.8 G/DL (ref 13–17.5)
IMM GRANULOCYTES # BLD AUTO: 0.05 X10(3) UL (ref 0–1)
IMM GRANULOCYTES NFR BLD: 0.7 %
INR BLD: 1.02 (ref 0.9–1.1)
LORAZEPAM, URINE: 2138 NG/ML
LYMPHOCYTES # BLD AUTO: 1.98 X10(3) UL (ref 1–4)
LYMPHOCYTES NFR BLD AUTO: 28.3 %
MCH RBC QN AUTO: 31 PG (ref 26–34)
MCH RBC QN AUTO: 31.5 PG (ref 26–34)
MCHC RBC AUTO-ENTMCNC: 34.2 G/DL (ref 31–37)
MCHC RBC AUTO-ENTMCNC: 35.4 G/DL (ref 31–37)
MCV RBC AUTO: 88.9 FL (ref 80–100)
MCV RBC AUTO: 90.7 FL (ref 80–100)
MIDAZOLAM, URINE: 27 NG/ML
MONOCYTES # BLD AUTO: 0.61 X10(3) UL (ref 0.1–1)
MONOCYTES NFR BLD AUTO: 8.7 %
NEUTROPHILS # BLD AUTO: 4.08 X10 (3) UL (ref 1.5–7.7)
NEUTROPHILS # BLD AUTO: 4.08 X10(3) UL (ref 1.5–7.7)
NEUTROPHILS NFR BLD AUTO: 58.5 %
NORDIAZEPAM, URINE: <20 NG/ML
OSMOLALITY SERPL CALC.SUM OF ELEC: 283 MOSM/KG (ref 275–295)
OXAZEPAM, URINE: <20 NG/ML
P AXIS: 43 DEGREES
P-R INTERVAL: 118 MS
PLATELET # BLD AUTO: 115 10(3)UL (ref 150–450)
PLATELET # BLD AUTO: 117 10(3)UL (ref 150–450)
POTASSIUM SERPL-SCNC: 4.1 MMOL/L (ref 3.5–5.1)
PSA SERPL DL<=0.01 NG/ML-MCNC: 13.8 SECONDS (ref 12.5–14.7)
Q-T INTERVAL: 468 MS
QRS DURATION: 82 MS
QTC CALCULATION (BEZET): 451 MS
R AXIS: 71 DEGREES
RBC # BLD AUTO: 2.35 X10(6)UL (ref 4.3–5.7)
RBC # BLD AUTO: 2.58 X10(6)UL (ref 4.3–5.7)
SODIUM SERPL-SCNC: 138 MMOL/L (ref 136–145)
T AXIS: 72 DEGREES
TEMAZEPAM, URINE: <20 NG/ML
VENTRICULAR RATE: 56 BPM
WBC # BLD AUTO: 7 X10(3) UL (ref 4–11)
WBC # BLD AUTO: 7.3 X10(3) UL (ref 4–11)

## 2019-08-12 PROCEDURE — 99222 1ST HOSP IP/OBS MODERATE 55: CPT | Performed by: INTERNAL MEDICINE

## 2019-08-12 PROCEDURE — 99233 SBSQ HOSP IP/OBS HIGH 50: CPT | Performed by: STUDENT IN AN ORGANIZED HEALTH CARE EDUCATION/TRAINING PROGRAM

## 2019-08-12 PROCEDURE — 74176 CT ABD & PELVIS W/O CONTRAST: CPT | Performed by: INTERNAL MEDICINE

## 2019-08-12 PROCEDURE — 99232 SBSQ HOSP IP/OBS MODERATE 35: CPT | Performed by: INTERNAL MEDICINE

## 2019-08-12 RX ORDER — CALCIUM CARBONATE 200(500)MG
1000 TABLET,CHEWABLE ORAL 2 TIMES DAILY PRN
Status: DISCONTINUED | OUTPATIENT
Start: 2019-08-12 | End: 2019-08-14

## 2019-08-12 RX ORDER — HEPARIN SODIUM 5000 [USP'U]/ML
30 INJECTION, SOLUTION INTRAVENOUS; SUBCUTANEOUS ONCE
Status: COMPLETED | OUTPATIENT
Start: 2019-08-12 | End: 2019-08-12

## 2019-08-12 RX ORDER — CALCIUM CARBONATE 200(500)MG
500 TABLET,CHEWABLE ORAL 3 TIMES DAILY PRN
Status: DISCONTINUED | OUTPATIENT
Start: 2019-08-12 | End: 2019-08-14

## 2019-08-12 RX ORDER — SODIUM CHLORIDE 9 MG/ML
INJECTION, SOLUTION INTRAVENOUS ONCE
Status: COMPLETED | OUTPATIENT
Start: 2019-08-12 | End: 2019-08-12

## 2019-08-12 NOTE — OCCUPATIONAL THERAPY NOTE
OCCUPATIONAL THERAPY QUICK EVALUATION - INPATIENT    Room Number: 7855/4998-G  Evaluation Date: 8/12/2019     Type of Evaluation: Initial  Presenting Problem: DVT, PE    Physician Order: IP Consult to Occupational Therapy  Reason for Therapy:  ADL/IADL Dys History:   Diagnosis Date   • Anxiety state        Past Surgical History  History reviewed. No pertinent surgical history.     OCCUPATIONAL PROFILE    HOME SITUATION  Type of Home: House  Home Layout: Two level  Lives With: (Father)    Toilet and Equipment: MMT and ROM on pt, pt completed sit to stand with supervision, pt was able to amb x1 in room and hallway and bathroom with supervision, therapist assist pt to complete dressing at chair simulated with supervision and toileting at toilet level with supervis to demonstrate safety with ADLS: at previous functional level

## 2019-08-12 NOTE — PROCEDURES
[address]    PATIENT'S NAME: Neema Sorto, [address]   ATTENDING PHYSICIAN: Hawa Shankar. Harriette Gitelman, MD   OPERATING PHYSICIAN: Malka Ann.  MD Suzy   PATIENT ACCOUNT#:   [id number]    LOCATION:  [address] [place] [address]  MEDICAL RECORD #:   [id number]       DATE OF BIR with his preoperative baseline. INDICATION FOR PROCEDURE:  This is a 25-year-old male who presented with 2A ischemia with a popliteal artery thrombosis with compromise of outflow due to paradoxical embolus.   He underwent thrombolysis catheter placement infusion was re-initiated. The patient awoke from conscious sedation, was taken to the surgical intensive care unit in stable condition. All counts were correct at the end of the case. I was present and performed all critical portions of the procedure.

## 2019-08-12 NOTE — PLAN OF CARE
Assumed care at [address] [facility]. Heparin gtt infusing per DVT/PE protocol. All pulses to feet audible with doppler. Bilater feet slightly cool to touch, toes on left foot with some numbness still present.   Right groin ecchymotic, no hematoma palpated, occlusive

## 2019-08-12 NOTE — PROCEDURES
[facility]    PATIENT'S NAME: Laurel Austin       OPERATING PHYSICIAN: Gatito Almonte MD   PATIENT ACCOUNT#:   [id number]    LOCATION:  [address] [place] [address]  MEDICAL RECORD #:   [id number]       YOB: 1995  ADMISSION DATE: baseline. INDICATION FOR PROCEDURE:  This is a 13-year-old male who had presented with [place] 2A ischemia.   He underwent thrombolysis catheter placement on a subsequent check and now presents for a catheter check, as well as all indicated interventi level of the DP with a 2.5 mm balloon. We then ballooned the popliteal and TP trunk with a 5 mm balloon and then performed repeat left leg angiogram that revealed dramatic improvement with patency of the popliteal, TP trunk, peroneal, and PT.   The peronea at the end the case. I was present and performed all portions of the procedure. Dictated By Fredy Almonte MD  d: 08/11/2019 21:06:56  t: 08/11/2019 80:57:93  [facility] [id number]  [facility]/

## 2019-08-12 NOTE — PROGRESS NOTES
[facility]  Progress Note     [facility] Patient Status:  Inpatient    1995 MRN [id number]   [facility] 6NE-A Attending Raegan Madrid MD   Hosp Day # 2 PCP No primary care provider on file.      Chief Complaint: Ish Carrillo AST 30  --   --   --    ALT 34  --   --   --    BILT 0.4  --   --   --    TP 7.4  --   --   --        Estimated Creatinine Clearance: 203.4 mL/min (A) (based on SCr of 0.62 mg/dL (L)).     Recent Labs   Lab 08/11/19  0416 08/11/19  0800 08/12/19  0432   P

## 2019-08-12 NOTE — PROGRESS NOTES
Heme/Onc Progress Note - [facility]      Chief Complaint:    Follow up for evaluation and management of DVT, PE and arterial embolism. Interim History:      He is doing better. He has some left leg pain. He has no other new complaints.     Physical Examination imaging reviewed at this visit:    CT of the abd/pelvis on today:  FINDINGS:    LIVER:  No enlargement, atrophy, abnormal density, or significant focal lesion. BILIARY:  No visible dilatation or calcification.     PANCREAS:  No lesion, fluid collection, right thigh is recommended for assessment. Impression:     Hypercoagulable state with DVT and PE:  Left leg popliteal artery embolism  PFO  Anemia secondary to blood loss    S/P TPA, on heparin. Now with hematoma in right iliopsoas with anemia.  Melda Burn

## 2019-08-12 NOTE — PROGRESS NOTES
Vascular Surgery Progress Note    Pt seen and examined this am. Pain continuing to improve. Mild calf tenderness. Numbness in 3rd and 4th toes unchanged bu states otherwise foot feels better. Able to ambulate without any issues. Vitals and labs are wnl.  On

## 2019-08-12 NOTE — CM/SW NOTE
08/12/19 0900   CM/SW Referral Data   Referral Source Physician   Reason for Referral Discharge planning   Informant Patient   Social History   Recreational Drug/Alcohol Use no   Major Changes Last 6 Months no   Domestic/Partner Violence no   Suicidal I

## 2019-08-12 NOTE — PHYSICAL THERAPY NOTE
PHYSICAL THERAPY EVALUATION - INPATIENT     Room Number: 8782/4810-P  Evaluation Date: 8/12/2019  Type of Evaluation: Initial  Physician Order: PT Eval and Treat    Presenting Problem: LLE artery occlusion, LLE DVT, bilateral PE, and PFO  Reason for Ther History  Past Medical History:   Diagnosis Date   • Anxiety state        Past Surgical History  History reviewed. No pertinent surgical history.     HOME SITUATION  Type of Home: House   Home Layout: Two level  Stairs to Enter : 9  Railing: Yes  Stairs to B back to sitting on the side of the bed?: None   How much help from another person does the patient currently need. ..   -   Moving to and from a bed to a chair (including a wheelchair)?: None   -   Need to walk in hospital room?: A Little   -   Climbing 3-5 occlusion. Pertinent comorbidities and personal factors impacting therapy include LLE DVT, bilateral PE, ETOH and drug abuse. In this PT evaluation, the patient presents with the following impairments: 1) pain and 2) impaired dynamic standing balance.   Natalee Escalona

## 2019-08-12 NOTE — BH LEVEL OF CARE ASSESSMENT
Level of Care Assessment Note    General Questions  Why are you here?: The pt states he had a blood clot in his foot and needed an operation. Precipitating Events: Pt came into the hospital and placed on CIWA protocol. The nurse said, the pt has anxiety. days ago.   Family History or Personal Lived Experience of Loss or Near Loss by Suicide: Denies    Danger to Others/Property  Have you harmed someone or had thoughts about wanting someone harmed or killed in the past 30 days?: No  Have you harmed someone or denies  Appetite Symptoms: Normal for patient  Unplanned Weight Loss: Yes (comment)(states lost 25 lbs for the past 1 month.)  Unplanned Weight Gain: No  History of Eating Disorder: No  Active Eating Disorder: No    IBW Calculations  Weight: 171 lb 1.2 oz Social Relationships: Yes  Describe Concerns/Conflicts with Social Relationships[de-identified] Girlfriend  Do you have any prior/current legal concerns?: DUI(pt states 1 year ago a dui.  )  History of Gang Involvement: No  Type of Residence: Private residence    Abuse He denies any suicidal thoughts. He reports a history of seeing a psychotherapist 7 years ago and most recent 3 years ago. The pt states he does not want to stop smoking cannabis. He feels he can stop drinking etoh on his own.   He said he is not one to t

## 2019-08-12 NOTE — PLAN OF CARE
Assumed care of patient at 0730. Patient A/Ox4. Breath sounds clear on RA . VSS, NSR on the monitor. ST with activity. Pulses palpable and verified with doppler. Voiding in the urinal. Norco given for left leg pain.  Ativan given for anxiety with good relie

## 2019-08-12 NOTE — PROCEDURES
[facility]    PATIENT'S NAME: Radha Deisy       OPERATING PHYSICIAN: Ina Gomez.  MD Suzy   PATIENT ACCOUNT#:   [id number]    LOCATION:  [address] [place] [address]  MEDICAL RECORD #:   [id number]       YOB: 1995  ADMISSION DATE: started on a heparin drip. Based on discussion with the patient, we felt that he would benefit from thrombolysis catheter placement for recruitment of outflow, with the possibility for all other intervention.   The risks and benefits of the procedure were the common femoral.  We then utilized a Quick-Cross catheter and Glidewire to traverse the entirety of the SFA through the popliteal into what appeared to be the peroneal.  We could not locate the ostium of this.   At this point, we felt, from a risk/benefi

## 2019-08-12 NOTE — CONSULTS
[facility]  Cardiology Consultation    Jovitakarri Marquez Patient Status:  Inpatient    1995 MRN [id number]   [facility] 6NE-A Attending Lynne Madrigal MD   Hosp Day # 2 PCP No primary care provider on file.      Reason fo Continuous  •  acetaminophen (TYLENOL) tab 650 mg, 650 mg, Oral, Q4H PRN **OR** HYDROcodone-acetaminophen (NORCO) 5-325 MG per tab 1 tablet, 1 tablet, Oral, Q4H PRN **OR** HYDROcodone-acetaminophen (NORCO) 5-325 MG per tab 2 tablet, 2 tablet, Oral, Q4H PRN rhonchi or dullness. Normal excursions and effort. Abdomen: Soft, non-tender. Extremities: Without clubbing, cyanosis or edema. Peripheral pulses are 2+. Neurologic: Alert and oriented, normal affect. Skin: Warm and dry.      Laboratory Data:  Lab Res vascular surgery and hematology service as currently planned  -he may follow-up with either Juliana Taylor or Diogo Le as an outpatient for further evaluation/consideration of PFO closure but agree with anticoagulation when deemed acceptable by vascular ruffin

## 2019-08-12 NOTE — BRIEF OP NOTE
Pre-Operative Diagnosis: [place] 2A ischemia     Post-Operative Diagnosis: [place] 2 A ischemia     Procedure Performed:   1. Selective Catheterization of 3rd order branch  2. LLE angio with radiologic supervision and interpretation  3.  Angioplasty o

## 2019-08-12 NOTE — PROGRESS NOTES
Pulmonary Progress Note        NAME: CHELITA. [address]: 7928/2880-D - MRN: [id number] - Age: 22year old - : 1995        Last 24hrs: No events overnight, worked w/ PT this AM, main complaint is his heartburn    OBJECTIVE:   19  0500 08 08/09/19  1821   ALT 34   AST 30   ALB 3.5       Invalid input(s): ARTERIALPH, ARTERIALPO2, ARTERIALPCO2, ARTERIALHCO3    No results for input(s): BNP in the last 72 hours.     Invalid input(s): TROPI    INR   Date/Time Value Ref Range Status   08/12/2019 0

## 2019-08-13 LAB
APTT PPP: 65.4 SECONDS (ref 25.4–36.1)
BLOOD TYPE BARCODE: 600
BLOOD TYPE BARCODE: 600
DRUG CONFIRMATION,CANNABINOIDS: >500 NG/ML
HGB BLD-MCNC: 8.3 G/DL (ref 13–17.5)
HGB BLD-MCNC: 8.6 G/DL (ref 13–17.5)
OPIATES, UR, 6-ACETYLMORPHINE: <10 NG/ML
OPIATES, URINE, CODEINE: <20 NG/ML
OPIATES, URINE, HYDROCODONE: <20 NG/ML
OPIATES, URINE, HYDROMORPHONE: <20 NG/ML
OPIATES, URINE, MORPHINE: 1777 NG/ML
OPIATES, URINE, NORHYDROCODONE: <20 NG/ML
OPIATES, URINE, NOROXYCODONE: <20 NG/ML
OPIATES, URINE, NOROXYMORPHONE: <20 NG/ML
OPIATES, URINE, OXYCODONE: <20 NG/ML
OPIATES, URINE, OXYMORPHONE: <20 NG/ML

## 2019-08-13 PROCEDURE — 90792 PSYCH DIAG EVAL W/MED SRVCS: CPT | Performed by: OTHER

## 2019-08-13 PROCEDURE — 99233 SBSQ HOSP IP/OBS HIGH 50: CPT | Performed by: STUDENT IN AN ORGANIZED HEALTH CARE EDUCATION/TRAINING PROGRAM

## 2019-08-13 PROCEDURE — 99232 SBSQ HOSP IP/OBS MODERATE 35: CPT | Performed by: SPECIALIST

## 2019-08-13 RX ORDER — MIRTAZAPINE 15 MG/1
15 TABLET, FILM COATED ORAL NIGHTLY
Status: DISCONTINUED | OUTPATIENT
Start: 2019-08-13 | End: 2019-08-14

## 2019-08-13 RX ORDER — PANTOPRAZOLE SODIUM 40 MG/1
40 TABLET, DELAYED RELEASE ORAL EVERY EVENING
Status: DISCONTINUED | OUTPATIENT
Start: 2019-08-13 | End: 2019-08-14

## 2019-08-13 NOTE — PLAN OF CARE
Received pt from CCU around 1515. A&Ox4, VSS on RA, NSR on tele  Post tib and pedal pulses palpable. 1+ pitting edema to LLE. Bruising to R groin and thigh noted  C/o pain to L groin. Norco given  Pt anxious. Ativan given  Parents at bedside.  Updated on PO

## 2019-08-13 NOTE — PLAN OF CARE
Pt doing well, therapeutic on heparin PE protocol. R leg echymosis stable, soft. Pt reports L thigh and groin pain with movement in and out of bed but states he feels like it's getting better. Pulses palpable in both feet.  Left dorsalis pedis very faint an

## 2019-08-13 NOTE — PROGRESS NOTES
Patient is doing well  On heparin drip  Denies any calf pain. He has a palpable left posterior tibial pulse and a biphasic peroneal signal and a monophasic dorsalis pedis signal  The right groin access site is soft.     He is okay to be transferred to the

## 2019-08-13 NOTE — PHYSICAL THERAPY NOTE
PHYSICAL THERAPY TREATMENT NOTE - INPATIENT    Room Number: 2906/7466-D     Session: 1   Number of Visits to Meet Established Goals: 2    Presenting Problem: LLE artery occlusion, LLE DVT, bilateral PE, and PFO     History related to current admission: Pt adjusting bedclothes, sheets and blankets)?: None   -   Sitting down on and standing up from a chair with arms (e.g., wheelchair, bedside commode, etc.): None   -   Moving from lying on back to sitting on the side of the bed?: None   How much help from ano Endurance; Patient education;Gait training;Stair training;Balance training  Rehab Potential : Good  Frequency (Obs): 3x/week    CURRENT GOALS   Goal #1 Patient is able to demonstrate supine - sit EOB @ level: independent.      Goal #2 Patient is able to [facility]

## 2019-08-13 NOTE — CONSULTS
[facility]  Report of Psychiatric Consultation    Jose David Galaviz Patient Status:  Inpatient    1995 MRN [id number]   [facility] 6NE-A Attending Amy Buckner MD   Hosp Day # 4 PCP No primary care provider on file.      Date of Psych consulted to maría for anxiety/depression and substance use disorder. He admits to being an anxious person his entire life. He worried about everything and has trouble relaxing. He always feels tense and on edge.  He began smoking marijuana at age [age]. No elevated mood, racing thoughts, decreased need for sleep, grandiose thoughts. Past Psychiatric History: Anxiety and depressive disorder unspecified.  He had suicidal ideation with a plan to jump off a bridge 5 yrs ago, but he DID NOT follow **OR** morphINE sulfate (PF) 4 MG/ML injection 2 mg, 2 mg, Intravenous, Q2H PRN **OR** morphINE sulfate (PF) 4 MG/ML injection 4 mg, 4 mg, Intravenous, Q2H PRN  •  Metoclopramide HCl (REGLAN) injection 10 mg, 10 mg, Intravenous, Q8H PRN  •  ondansetron HCl

## 2019-08-13 NOTE — PLAN OF CARE
Pt received sitting up in the chair. Pt's mom at bedside. [place] given for complaints of left groin/upper thigh pain. Warm pack also applied. Pt verbalized adequate relief. Left pedal and post tib pulses palpable.  Pt continues to complain of numbness to [facility]

## 2019-08-13 NOTE — PROGRESS NOTES
Pulmonary Progress Note      NAME: Shannan Archer - ROOM: 3769/0044-J - MRN: [id number] - Age: 22year old - : 1995    Assessment/Plan:  1. Acute blood loss anemia - due to procedural issues and blood thinners  -Hgb now stable  2.  LLE arterial o --   --    NEPRELIM 4.08  --   --   --    WBC 7.0 7.3  --   --    .0* 117.0*  --   --     < > = values in this interval not displayed.      Recent Labs   Lab 08/09/19  1821 08/10/19  0438 08/11/19  0416 08/12/19  0432   GLU 92 81 94 87   BUN 6* 5* 5

## 2019-08-13 NOTE — PROGRESS NOTES
Heme/Onc Progress Note      Subjective   Patient feels well. States ecchymosis in right thigh increased but swelling decreased. No shortness of breath. No bleeding.      Physical Examination:  Vital Signs: /59 (BP Location: Left arm)   Pulse 87   Temp surgery has cleared patient to transition to oral DOAC. Will plan to start rivaroxaban tomorrow am.     2.   Anemia: Due to acute blood loss. Check iron studies tomorrow am.     Electronically signed by:    Ivana Larios M.D.   [facility] [address] [place] [address]

## 2019-08-13 NOTE — PROGRESS NOTES
[facility]  Progress Note     Sundeep Washburn Patient Status:  Inpatient    1995 MRN [id number]   [facility] 6NE-A Attending Cristi Starks MD   Hosp Day # 3 PCP No primary care provider on file.      Chief Complaint: Kathryn Ribera 3.8 4.1    109 106 105   CO2 26.0 24.0 23.0 27.0   ALKPHO 88  --   --   --    AST 30  --   --   --    ALT 34  --   --   --    BILT 0.4  --   --   --    TP 7.4  --   --   --        Estimated Creatinine Clearance: 202.9 mL/min (A) (based on SCr of 0.62

## 2019-08-14 VITALS
WEIGHT: 170.63 LBS | HEIGHT: 76 IN | HEART RATE: 93 BPM | OXYGEN SATURATION: 100 % | TEMPERATURE: 98 F | RESPIRATION RATE: 22 BRPM | DIASTOLIC BLOOD PRESSURE: 60 MMHG | BODY MASS INDEX: 20.78 KG/M2 | SYSTOLIC BLOOD PRESSURE: 121 MMHG

## 2019-08-14 LAB
ANION GAP SERPL CALC-SCNC: 5 MMOL/L (ref 0–18)
APTT PPP: 59.7 SECONDS (ref 25.4–36.1)
BASOPHILS # BLD AUTO: 0.02 X10(3) UL (ref 0–0.2)
BASOPHILS NFR BLD AUTO: 0.5 %
BUN BLD-MCNC: 5 MG/DL (ref 7–18)
BUN/CREAT SERPL: 7 (ref 10–20)
CALCIUM BLD-MCNC: 8.2 MG/DL (ref 8.5–10.1)
CHLORIDE SERPL-SCNC: 107 MMOL/L (ref 98–112)
CO2 SERPL-SCNC: 29 MMOL/L (ref 21–32)
CREAT BLD-MCNC: 0.71 MG/DL (ref 0.7–1.3)
DEPRECATED HBV CORE AB SER IA-ACNC: 358 NG/ML (ref 38–270)
DEPRECATED RDW RBC AUTO: 43.6 FL (ref 35.1–46.3)
EOSINOPHIL # BLD AUTO: 0.21 X10(3) UL (ref 0–0.7)
EOSINOPHIL NFR BLD AUTO: 5.2 %
ERYTHROCYTE [DISTWIDTH] IN BLOOD BY AUTOMATED COUNT: 13.4 % (ref 11–15)
GLUCOSE BLD-MCNC: 111 MG/DL (ref 70–99)
HCT VFR BLD AUTO: 23.5 % (ref 39–53)
HGB BLD-MCNC: 8.2 G/DL (ref 13–17.5)
IMM GRANULOCYTES # BLD AUTO: 0.02 X10(3) UL (ref 0–1)
IMM GRANULOCYTES NFR BLD: 0.5 %
IRON SATURATION: 73 % (ref 20–50)
IRON SERPL-MCNC: 160 UG/DL (ref 65–175)
LYMPHOCYTES # BLD AUTO: 1.83 X10(3) UL (ref 1–4)
LYMPHOCYTES NFR BLD AUTO: 45 %
MCH RBC QN AUTO: 31.1 PG (ref 26–34)
MCHC RBC AUTO-ENTMCNC: 34.9 G/DL (ref 31–37)
MCV RBC AUTO: 89 FL (ref 80–100)
MONOCYTES # BLD AUTO: 0.4 X10(3) UL (ref 0.1–1)
MONOCYTES NFR BLD AUTO: 9.8 %
NEUTROPHILS # BLD AUTO: 1.59 X10 (3) UL (ref 1.5–7.7)
NEUTROPHILS # BLD AUTO: 1.59 X10(3) UL (ref 1.5–7.7)
NEUTROPHILS NFR BLD AUTO: 39 %
OSMOLALITY SERPL CALC.SUM OF ELEC: 290 MOSM/KG (ref 275–295)
PLATELET # BLD AUTO: 180 10(3)UL (ref 150–450)
POTASSIUM SERPL-SCNC: 3.3 MMOL/L (ref 3.5–5.1)
RBC # BLD AUTO: 2.64 X10(6)UL (ref 4.3–5.7)
SODIUM SERPL-SCNC: 141 MMOL/L (ref 136–145)
TOTAL IRON BINDING CAPACITY: 218 UG/DL (ref 240–450)
TRANSFERRIN SERPL-MCNC: 146 MG/DL (ref 200–360)
WBC # BLD AUTO: 4.1 X10(3) UL (ref 4–11)

## 2019-08-14 PROCEDURE — 99231 SBSQ HOSP IP/OBS SF/LOW 25: CPT | Performed by: SPECIALIST

## 2019-08-14 PROCEDURE — 99232 SBSQ HOSP IP/OBS MODERATE 35: CPT | Performed by: OTHER

## 2019-08-14 PROCEDURE — 99239 HOSP IP/OBS DSCHRG MGMT >30: CPT | Performed by: HOSPITALIST

## 2019-08-14 RX ORDER — MIRTAZAPINE 15 MG/1
15 TABLET, FILM COATED ORAL NIGHTLY
Qty: 30 TABLET | Refills: 0 | Status: SHIPPED | OUTPATIENT
Start: 2019-08-14 | End: 2019-11-01

## 2019-08-14 RX ORDER — POTASSIUM CHLORIDE 20 MEQ/1
40 TABLET, EXTENDED RELEASE ORAL EVERY 4 HOURS
Status: DISCONTINUED | OUTPATIENT
Start: 2019-08-14 | End: 2019-08-14

## 2019-08-14 RX ORDER — HYDROCODONE BITARTRATE AND ACETAMINOPHEN 5; 325 MG/1; MG/1
1-2 TABLET ORAL EVERY 8 HOURS PRN
Qty: 30 TABLET | Refills: 0 | Status: SHIPPED | OUTPATIENT
Start: 2019-08-14 | End: 2019-10-28 | Stop reason: ALTCHOICE

## 2019-08-14 NOTE — PROGRESS NOTES
[facility]  Report of Psychiatric Progress Note    Gisella Bellamy Patient Status:  Inpatient    1995 MRN [id number]   [facility] 6NE-A Attending Haroldo Bustos MD   Hosp Day # 5 PCP No primary care provider on file.      Date o for anxiety/depression and substance use disorder. He admits to being an anxious person his entire life. He worried about everything and has trouble relaxing. He always feels tense and on edge.  He began smoking marijuana at age 16, He used to smoke over 1 increased his mood irritability, but isn't sure. He has mild anxiety and depressed mood. Feels hopeful. He still plans to STOP drinking alcohol, but will continue to smoke marijuana. No hopelessness, voices/visions, or suicidal ideation.     Psychiatry Revi 0.5 mg, Oral, Q6H PRN  •  heparin (PORCINE) drip 94009hmfcr/250mL infusion CONTINUOUS, 200-3,000 Units/hr, Intravenous, Continuous  •  acetaminophen (TYLENOL) tab 650 mg, 650 mg, Oral, Q4H PRN **OR** HYDROcodone-acetaminophen (NORCO) 5-325 MG per tab 1 tab Data:  Lab Results   Component Value Date    WBC 4.1 08/14/2019    HGB 8.2 08/14/2019    HCT 23.5 08/14/2019    .0 08/14/2019    CREATSERUM 0.71 08/14/2019    BUN 5 08/14/2019     08/14/2019    K 3.3 08/14/2019     08/14/2019    CO2 29.0

## 2019-08-14 NOTE — PROGRESS NOTES
Pulmonary Progress Note        NAME: Mackenzie [facility] - ROOM: 3083/7444-O - MRN: [id number] - Age: 22year old - : 1995        Last 24hrs: No events overnight, sitting up in bed, comfortable    OBJECTIVE:   19  1700 19 displayed. Recent Labs     08/12/19  0432 08/14/19  0608    141   K 4.1 3.3*    107   CO2 27.0 29.0   BUN 6* 5*   CA 7.8* 8.2*   MG 2.1  --        No results for input(s): ALT, AST, ALB, AMYLASE, LIPASE, LDH in the last 72 hours.     Inval

## 2019-08-14 NOTE — PHYSICAL THERAPY NOTE
PHYSICAL THERAPY TREATMENT NOTE - INPATIENT    Room Number: 9610/9406-O     Session: 2   Number of Visits to Meet Established Goals: 5    Presenting Problem: LLE artery occlusion, LLE DVT, bilateral PE, and PFO    History related to current admission: Pt AM-PAC '6-Clicks' INPATIENT SHORT FORM - BASIC MOBILITY  How much difficulty does the patient currently have. ..  -   Turning over in bed (including adjusting bedclothes, sheets and blankets)?: None   -   Sitting down on and standing up from a chair All questions/concerns addressed. Patient End of Session: In bed;Needs met;Call light within reach;RN aware of session/findings; All patient questions and concerns addressed    ASSESSMENT   Pt demonstrates good progress towards functional goals with

## 2019-08-14 NOTE — PROGRESS NOTES
Vascular surgery progress note    Patient seen and examined this a.m. He is status post left lower extremity thrombectomy, balloon angioplasty of the popliteal, PT, peroneal, anterior tibial arteries. No issues overnight.   His pain is very well controlle

## 2019-08-14 NOTE — PLAN OF CARE
Assumed care @ 0700. A&Ox4, VSS on RA, NSR on tele   Heparin gtt d/c'd. Xarelto started  Pedal and post tib pulses palpable. LLE with nonpitting edema.  Bruising to R thigh and groin  C/o severe pain to LLE and R groin upon awakening this am. Norco not yet

## 2019-08-14 NOTE — PROGRESS NOTES
[facility]  Progress Note     Cjgraham Gomess Patient Status:  Inpatient    1995 MRN [id number]   [facility] 7NE-A Attending [facility] Day # 4 PCP No primary care provider on file.      Chief Complaint: > 145 140 132   CA 8.9   < > 8.1* 7.8* 8.2*   ALB 3.5  --   --   --   --       < > 136 138 141   K 4.6   < > 3.8 4.1 3.3*      < > 106 105 107   CO2 26.0   < > 23.0 27.0 29.0   ALKPHO 88  --   --   --   --    AST 30  --   --   --   --    ALT 34

## 2019-08-14 NOTE — PLAN OF CARE
Assumed care at [address]. C/o pain & N/T to L heel & last 3 L toes. Also c/o pain to bilateral groins. Norco given. +1 edema noted to LLE. Bilateral post tib & pedal pulses palpable. Bruising to RLE. Hep gtt infusing as ordered.         Problem: HEMATOLO grooming, and bathing  - Educate and encourage patient/family in tolerated functional activity level and precautions during self-care     Outcome: Progressing     Problem: PAIN - ADULT  Goal: Verbalizes/displays adequate comfort level or patient's stated p

## 2019-08-15 NOTE — DISCHARGE SUMMARY
[facility]PITAL[facility]  DISCHARGE SUMMARY     Gisella Bellamy Patient Status:  Inpatient    1995 MRN [id number]   [facility] 7NE-A Attending No att. providers found   Hosp Day # 4 PCP No primary care provider on file.      Date of Admiss perineal artery and angioplasty of the left posterior tibialis and angioplasty of left anterior tibial and dorsalis pedis with suction thrombectomy of the left popliteal and left posterior tibialis.   Patient was continued on heparin drip hematology consult rivaroxaban 15 & 20 MG Tbpk      Take 15 mg by mouth 2 (two) times daily with meals for 21 days.  Take 15 mg by mouth twice daily with meals for the first  21 days (42 tablets total), then take 20 mg by mouth once a day with meals (9 tablets total) for Da Clear to auscultation bilaterally. No wheezes. No rhonchi. Cardiovascular: S1, S2. Regular rate and rhythm. No murmurs, rubs or gallops. Abdomen: Soft, nontender, nondistended. Positive bowel sounds. No rebound or guarding.   Neurologic: No focal neurol

## 2019-08-15 NOTE — PROGRESS NOTES
Heme/Onc Progress Note      Subjective   Patient feels well. No bleeding.      Physical Examination:  Vital Signs: /60 (BP Location: Left arm)   Pulse 93   Temp 98.2 °F (36.8 °C) (Oral)   Resp 22   Ht 1.93 m (6' 4\")   Wt 77.4 kg (170 lb 10.2 oz)   Sp 15.0 %    RDW-SD 43.6 35.1 - 46.3 fL    Neutrophil Absolute Prelim 1.59 1.50 - 7.70 x10 (3) uL    Neutrophil Absolute 1.59 1.50 - 7.70 x10(3) uL    Lymphocyte Absolute 1.83 1.00 - 4.00 x10(3) uL    Monocyte Absolute 0.40 0.10 - 1.00 x10(3) uL    Eosinophil

## 2019-08-30 ENCOUNTER — OFFICE VISIT (OUTPATIENT)
Dept: HEMATOLOGY/ONCOLOGY | Age: 24
End: 2019-08-30
Attending: INTERNAL MEDICINE
Payer: MEDICAID

## 2019-08-30 VITALS
HEART RATE: 86 BPM | BODY MASS INDEX: 20 KG/M2 | OXYGEN SATURATION: 97 % | SYSTOLIC BLOOD PRESSURE: 111 MMHG | TEMPERATURE: 99 F | WEIGHT: 167.88 LBS | RESPIRATION RATE: 18 BRPM | DIASTOLIC BLOOD PRESSURE: 66 MMHG

## 2019-08-30 DIAGNOSIS — I26.99 ACUTE PULMONARY EMBOLISM WITHOUT ACUTE COR PULMONALE, UNSPECIFIED PULMONARY EMBOLISM TYPE (HCC): ICD-10-CM

## 2019-08-30 DIAGNOSIS — I82.492 ACUTE DEEP VEIN THROMBOSIS (DVT) OF OTHER SPECIFIED VEIN OF LEFT LOWER EXTREMITY (HCC): Primary | ICD-10-CM

## 2019-08-30 PROCEDURE — 99214 OFFICE O/P EST MOD 30 MIN: CPT | Performed by: INTERNAL MEDICINE

## 2019-08-30 RX ORDER — OMEPRAZOLE 20 MG/1
20 CAPSULE, DELAYED RELEASE ORAL
COMMUNITY

## 2019-08-30 RX ORDER — ACETAMINOPHEN 325 MG/1
325 TABLET ORAL EVERY 6 HOURS PRN
COMMUNITY
End: 2019-11-01

## 2019-08-30 NOTE — PROGRESS NOTES
Cancer Center Progress Note    Problem List:      Patient Active Problem List:     Neuritis of left sural nerve     Acute deep vein thrombosis (DVT) of other specified vein of left lower extremity ([address]     Arterial occlusion     Acute pulmonary embolism w lymphadenopathy. Genitourinary: Negative for dysuria or hematuria  Neurological: Negative for headaches, dizziness, speech problems, gait problems and focal weakness. Psychiatric: The patient's mood was calm and appropriate for this visit.   The pertinent erythema. No calf tenderness. Neurological: Grossly intact without focal motor or sensory deficit. Skin: No suspicious skin lesion, no rash, no ulceration. Lymphatics:  There is no palpable lymphadenopathy throughout in the cervical, supraclavicular, or wall thickening, lesion, or calculus. PELVIC NODES:  No adenopathy. PELVIC ORGANS:  Normal appearance of the prostate gland.   There is fat stranding within the extraperitoneal fat of the right hemipelvis with associated prominence, mild enlargement a

## 2019-09-11 ENCOUNTER — HOSPITAL ENCOUNTER (OUTPATIENT)
Dept: ULTRASOUND IMAGING | Age: 24
End: 2019-09-11
Attending: SURGERY
Payer: MEDICAID

## 2019-09-11 ENCOUNTER — HOSPITAL ENCOUNTER (OUTPATIENT)
Dept: CT IMAGING | Age: 24
Discharge: HOME | End: 2019-09-11
Attending: INTERNAL MEDICINE
Payer: MEDICAID

## 2019-09-11 DIAGNOSIS — I26.99 PULMONARY INFARCT (HCC): ICD-10-CM

## 2019-09-11 PROCEDURE — 71250 CT THORAX DX C-: CPT | Performed by: INTERNAL MEDICINE

## 2019-11-01 ENCOUNTER — OFFICE VISIT (OUTPATIENT)
Dept: HEMATOLOGY/ONCOLOGY | Age: 24
End: 2019-11-01
Attending: INTERNAL MEDICINE
Payer: MEDICAID

## 2019-11-01 VITALS
WEIGHT: 176 LBS | BODY MASS INDEX: 21 KG/M2 | OXYGEN SATURATION: 95 % | TEMPERATURE: 98 F | HEART RATE: 110 BPM | RESPIRATION RATE: 18 BRPM

## 2019-11-01 DIAGNOSIS — I26.99 ACUTE PULMONARY EMBOLISM WITHOUT ACUTE COR PULMONALE, UNSPECIFIED PULMONARY EMBOLISM TYPE (HCC): ICD-10-CM

## 2019-11-01 DIAGNOSIS — I82.492 ACUTE DEEP VEIN THROMBOSIS (DVT) OF OTHER SPECIFIED VEIN OF LEFT LOWER EXTREMITY (HCC): Primary | ICD-10-CM

## 2019-11-01 PROCEDURE — 99213 OFFICE O/P EST LOW 20 MIN: CPT | Performed by: INTERNAL MEDICINE

## 2019-11-01 NOTE — PROGRESS NOTES
Cancer Center Progress Note    Problem List:      Patient Active Problem List:     Neuritis of left sural nerve     Acute deep vein thrombosis (DVT) of other specified vein of left lower extremity ([address]     Arterial occlusion     Acute pulmonary embolism w lymphadenopathy. Genitourinary: Negative for dysuria or hematuria  Neurological: Negative for headaches, dizziness, speech problems, gait problems and focal weakness. Psychiatric: The patient's mood was calm and appropriate for this visit.   The pertinent visit:     Lab Results   Component Value Date    WBC 4.1 08/14/2019    RBC 2.64 (L) 08/14/2019    HGB 8.2 (L) 08/14/2019    HCT 23.5 (L) 08/14/2019    MCV 89.0 08/14/2019    MCH 31.1 08/14/2019    MCHC 34.9 08/14/2019    RDW 13.4 08/14/2019    .0 08 The findings described above include a newly detected solid pulmonary nodule of 4 mm or smaller average diameter.  Guidelines from the [address] [place] [address] for the follow-up and management of newly detected indeterminate pulmonary nodules in persons >34   y

## 2019-12-03 ENCOUNTER — TELEPHONE (OUTPATIENT)
Dept: HEMATOLOGY/ONCOLOGY | Facility: HOSPITAL | Age: 24
End: 2019-12-03

## 2019-12-03 NOTE — TELEPHONE ENCOUNTER
Paul Portillo called saying Suzi Kirbyy is running out of his xorelto, and they wanted to know if they should come back to get another weeks worth before he gets his insurance card.  Please call

## 2019-12-04 NOTE — TELEPHONE ENCOUNTER
Spoke with Susanna Tse- states that pt received Medicaid card in the mail yesterday. They are requesting a script to be sent to [facility] in [place]. Will check coverage, but may need to  Xarelto 20 mg sample if PA is required.  No samples are availabl

## 2024-05-22 NOTE — PLAN OF CARE
Protonix changed to PO per IV to PO protocol.     Ken Forrester, Gene  08/13/19 12:49 PM Detail Level: Detailed General Sunscreen Counseling: I recommended a broad spectrum sunscreen with a SPF of 30 or higher.  I explained that SPF 30 sunscreens block approximately 97 percent of the sun's harmful rays.  Sunscreens should be applied at least 15 minutes prior to expected sun exposure and then every 2 hours after that as long as sun exposure continues. If swimming or exercising sunscreen should be reapplied every 45 minutes to an hour after getting wet or sweating.  One ounce, or the equivalent of a shot glass full of sunscreen, is adequate to protect the skin not covered by a bathing suit. I also recommended a lip balm with a sunscreen as well. Sun protective clothing can be used in lieu of sunscreen but must be worn the entire time you are exposed to the sun's rays.

## 2024-08-31 ENCOUNTER — HOSPITAL ENCOUNTER (EMERGENCY)
Facility: HOSPITAL | Age: 29
Discharge: HOME | End: 2024-08-31
Attending: EMERGENCY MEDICINE
Payer: COMMERCIAL

## 2024-08-31 VITALS
SYSTOLIC BLOOD PRESSURE: 124 MMHG | BODY MASS INDEX: 22.53 KG/M2 | DIASTOLIC BLOOD PRESSURE: 87 MMHG | HEART RATE: 75 BPM | OXYGEN SATURATION: 97 % | RESPIRATION RATE: 16 BRPM | WEIGHT: 185 LBS | HEIGHT: 76 IN | TEMPERATURE: 97.5 F

## 2024-08-31 DIAGNOSIS — Z00.8 MEDICAL CLEARANCE FOR INCARCERATION: Primary | ICD-10-CM

## 2024-08-31 PROCEDURE — 99281 EMR DPT VST MAYX REQ PHY/QHP: CPT | Performed by: EMERGENCY MEDICINE

## 2024-08-31 ASSESSMENT — PAIN - FUNCTIONAL ASSESSMENT
PAIN_FUNCTIONAL_ASSESSMENT: 0-10
PAIN_FUNCTIONAL_ASSESSMENT: 0-10

## 2024-08-31 NOTE — ED PROVIDER NOTES
HPI   Chief Complaint   Patient presents with    Medical clearance     Patient was on 8 hour hold at  [facility] was arrested for being intoxicated. During booking made comment about being Suicidal. Since sobering up has been fine. Needs cleared medically for release.       Limitations to History: None    HPI: 29-year-old male presents for medical clearance.  Approximately 8 to 10 hours ago patient was arrested and was intoxicated.  States that he made a joke about harming himself given his anger at the situation.  Patient denies any suicidal or homicidal ideation.  Denies any other complaints at this time.    Additional History Obtained from: Police.    ------------------------------------------------------------------------------------------------------------------------------------------  Physical Exam:    VS: As documented in the triage note and EMR flowsheet from this visit were reviewed.    Appearance: Alert. cooperative,  in no acute distress.   Skin: Intact,  dry skin, no lesions, rash, petechiae or purpura.   Eyes: PERRLA, EOMs intact,  Conjunctiva pink with no redness or exudates.   HENT: Normocephalic, atraumatic. Nares patent. No intraoral lesions.   Neck: Supple, without meningismus. Trachea at midline. No lymphadenopathy.  Pulmonary: Clear bilaterally with good chest wall excursion. No rales, rhonchi or wheezing. No accessory muscle use or stridor.  Cardiac: Regular rate and rhythm, no rubs, murmurs, or gallops.   Abdomen: Abdomen is soft, nontender, and nondistended.  No palpable organomegaly.  No rebound or guarding.  No CVA tenderness. Nonsurgical abdomen.  Genitourinary: Exam deferred.  Musculoskeletal: Full range of motion.  Pulses full and equal. No cyanosis, clubbing, or edema.  Neurological:  Cranial nerves are grossly intact, grossly normal sensation, no weakness, no focal findings identified.  Psychiatric: Appropriate mood and affect.                Patient History   History reviewed. No  pertinent past medical history.  History reviewed. No pertinent surgical history.  No family history on file.  Social History     Tobacco Use    Smoking status: Not on file    Smokeless tobacco: Not on file   Substance Use Topics    Alcohol use: Not on file    Drug use: Not on file       Physical Exam   ED Triage Vitals   Temp Pulse Resp BP   -- -- -- --      SpO2 Temp src Heart Rate Source Patient Position   -- -- -- --      BP Location FiO2 (%)     -- --       Physical Exam      ED Course & MDM   Diagnoses as of 08/31/24 1449   Medical clearance for incarceration                 No data recorded                                 Medical Decision Making  Medical Decision Making:    Patient appears well nontoxic.  No intoxication.  Denies any homicidal or suicidal ideation.  Discharged to follow-up with primary care.    Escalation of Care:  Appropriate for discharge and follow-up with primary care.          Procedure  Procedures     Mele Oglesby,   08/31/24 1456

## (undated) NOTE — LETTER
[facility] [address], [address]  Consent for Procedure/Sedation    Date: 08/11/2019    Time: 08:15      1.  I authorize the performance upon Ingris Augustin the following: Peripheral angiography, atherectomy, percutaneous olson ends for purposes of reinstating the Do Not Resuscitate (DNR) order. 8. In the event your procedure results in extended X-Ray/fluoroscopy time, you may develop a skin reaction.       Signature of Patient: ________________________________________________

## (undated) NOTE — LETTER
[facility] [address], [address]  Consent for Procedure/Sedation    Date: 08/09/2019    Time: 2230      1.  I authorize the performance upon Danial Sewell the following: Peripheral angiography, atherectomy, percutaneous trans ends for purposes of reinstating the Do Not Resuscitate (DNR) order. 8. In the event your procedure results in extended X-Ray/fluoroscopy time, you may develop a skin reaction.       Signature of Patient: ________________________________________________

## (undated) NOTE — LETTER
[facility] [address], [address]  Consent for Procedure/Sedation    Date: 08/10/2019    Time: 1030      1.  I authorize the performance upon Dolly Clarke the following: Peripheral angiography, atherectomy, percutaneous trans ends for purposes of reinstating the Do Not Resuscitate (DNR) order. 8. In the event your procedure results in extended X-Ray/fluoroscopy time, you may develop a skin reaction.       Signature of Patient: ________________________________________________

## (undated) NOTE — LETTER
[address] [facility] FOR BLOOD OR BLOOD COMPONENTS      In the course of your treatment, it may become necessary to administer a transfusion of blood or blood components.  This form provides basic information concerning this proc explain the alternatives to you if it has not already been done. Mahi George, have read/had read to me the above. I understand the matters bearing on the decision whether or not to authorize a transfusion of blood or blood components.  I have no q

## (undated) NOTE — LETTER
Consent to Procedure/Sedation    Date: __________________    Time: _______________    1.  I authorize the performance upon Kaila Forrester the following: peripheral angiogram, possible peripheral angioplasty, thrombolysis, thrombectomy and/or arthrectomy Signature of person authorized to consent for patient: Relationship to patient:  ___________________________    ___________________    Witness: ____________________     Date: ______________    Printed: 8/9/2019   10:16 PM    Patient Name: Deysi Kaplan